# Patient Record
Sex: FEMALE | Race: WHITE | Employment: OTHER | URBAN - METROPOLITAN AREA
[De-identification: names, ages, dates, MRNs, and addresses within clinical notes are randomized per-mention and may not be internally consistent; named-entity substitution may affect disease eponyms.]

---

## 2020-08-25 RX ORDER — PROGESTERONE 100 MG/1
100 CAPSULE ORAL DAILY
COMMUNITY
Start: 2020-06-08

## 2020-08-25 RX ORDER — ESTRADIOL 1 MG/G
1 GEL TOPICAL DAILY
COMMUNITY
Start: 2020-08-13

## 2023-04-27 ENCOUNTER — TELEMEDICINE (OUTPATIENT)
Age: 63
End: 2023-04-27

## 2023-04-27 DIAGNOSIS — G89.29 CHRONIC PAIN OF LEFT KNEE: ICD-10-CM

## 2023-04-27 DIAGNOSIS — G89.29 CHRONIC PAIN OF RIGHT KNEE: Primary | ICD-10-CM

## 2023-04-27 DIAGNOSIS — M17.12 PRIMARY OSTEOARTHRITIS OF LEFT KNEE: ICD-10-CM

## 2023-04-27 DIAGNOSIS — M17.11 PRIMARY OSTEOARTHRITIS OF RIGHT KNEE: ICD-10-CM

## 2023-04-27 DIAGNOSIS — M25.562 CHRONIC PAIN OF LEFT KNEE: ICD-10-CM

## 2023-04-27 DIAGNOSIS — M25.561 CHRONIC PAIN OF RIGHT KNEE: Primary | ICD-10-CM

## 2023-04-27 RX ORDER — ALBUTEROL SULFATE 90 UG/1
AEROSOL, METERED RESPIRATORY (INHALATION)
COMMUNITY

## 2023-04-27 RX ORDER — AMITRIPTYLINE HYDROCHLORIDE 50 MG/1
TABLET, FILM COATED ORAL
COMMUNITY
Start: 2023-02-15

## 2023-04-27 RX ORDER — IBUPROFEN 800 MG/1
TABLET ORAL
COMMUNITY
Start: 2023-01-18

## 2023-04-27 RX ORDER — TRIAMCINOLONE ACETONIDE 1 MG/G
CREAM TOPICAL
COMMUNITY

## 2023-04-27 RX ORDER — DOXYCYCLINE HYCLATE 100 MG/1
CAPSULE ORAL
COMMUNITY

## 2023-04-27 RX ORDER — PROGESTERONE 100 MG/1
CAPSULE ORAL
COMMUNITY
Start: 2020-06-08

## 2023-04-27 RX ORDER — ESCITALOPRAM OXALATE 5 MG/1
TABLET ORAL
COMMUNITY

## 2023-04-27 RX ORDER — AMOXICILLIN 500 MG/1
CAPSULE ORAL
COMMUNITY
Start: 2023-04-26

## 2023-04-27 RX ORDER — CHLORHEXIDINE GLUCONATE 0.12 MG/ML
RINSE ORAL
COMMUNITY
Start: 2023-02-08

## 2023-04-27 RX ORDER — FLUTICASONE PROPIONATE 50 MCG
SPRAY, SUSPENSION (ML) NASAL
COMMUNITY

## 2023-04-27 RX ORDER — OXYCODONE HYDROCHLORIDE 10 MG/1
TABLET ORAL
COMMUNITY
Start: 2023-02-14

## 2023-04-27 RX ORDER — ESTRADIOL 1 MG/G
GEL TOPICAL
COMMUNITY
Start: 2020-08-13

## 2023-04-27 RX ORDER — PAROXETINE 10 MG/1
10 TABLET, FILM COATED ORAL DAILY
COMMUNITY
Start: 2023-03-06

## 2023-04-27 NOTE — PROGRESS NOTES
Name: Kary Null    : 1960       Chief Complaint   Patient presents with    Knee Pain        There were no vitals taken for this visit. No Known Allergies     Current Outpatient Medications   Medication Sig Dispense Refill    ibuprofen (ADVIL;MOTRIN) 800 MG tablet ibuprofen 800 mg tablet   TAKE 1 TABLET BY MOUTH THREE TIMES DAILY      Estradiol (DIVIGEL) 1 MG/GM GEL Divigel 1 mg/gram (0.1 %) transdermal gel packet   APPLY 1 PACKET TO UPPER THIGH ONCE DAILY      progesterone (PROMETRIUM) 100 MG CAPS capsule progesterone micronized 100 mg capsule   TAKE ONE CAPSULE BY MOUTH AT BEDTIME      albuterol sulfate HFA (PROVENTIL;VENTOLIN;PROAIR) 108 (90 Base) MCG/ACT inhaler albuterol sulfate HFA 90 mcg/actuation aerosol inhaler   INHALE 2 PUFFS BY MOUTH EVERY 4 HOURS FOR 10 DAYS AS NEEDED      amoxicillin (AMOXIL) 500 MG capsule       amitriptyline (ELAVIL) 50 MG tablet TAKE 1 TABLET BY MOUTH EVERYDAY AT BEDTIME      chlorhexidine (PERIDEX) 0.12 % solution 15 MILLILITER(S) 2 TIMES A DAY SWISH AND SPIT AFTER BREAKFAST AND AT BEDTIME FOR 7 DAYS      doxycycline hyclate (VIBRAMYCIN) 100 MG capsule doxycycline hyclate 100 mg capsule   Take 1 capsule twice a day by oral route as directed for 1 day. ONE NOW WITH FOOD- and the other later today- with your supper. escitalopram (LEXAPRO) 5 MG tablet escitalopram 5 mg tablet   TAKE 1 TABLET BY MOUTH EVERY DAY      fluticasone (FLONASE) 50 MCG/ACT nasal spray fluticasone propionate 50 mcg/actuation nasal spray,suspension      oxyCODONE HCl (OXY-IR) 10 MG immediate release tablet TAKE 1 TABLET BY MOUTH EVERY 6 HOURS AS NEEDED FOR SEVERE PAIN      triamcinolone (KENALOG) 0.1 % cream triamcinolone acetonide 0.1 % topical cream   APPLY TOPICALLY TO THE AFFECTED AREA TWICE DAILY      PARoxetine (PAXIL) 10 MG tablet Take 1 tablet by mouth daily       No current facility-administered medications for this visit.         There is no problem list on file for this

## 2023-05-04 ENCOUNTER — OFFICE VISIT (OUTPATIENT)
Age: 63
End: 2023-05-04

## 2023-05-04 VITALS — WEIGHT: 205 LBS | BODY MASS INDEX: 36.32 KG/M2 | HEIGHT: 63 IN

## 2023-05-04 DIAGNOSIS — M25.561 CHRONIC PAIN OF RIGHT KNEE: ICD-10-CM

## 2023-05-04 DIAGNOSIS — M17.11 PRIMARY OSTEOARTHRITIS OF RIGHT KNEE: ICD-10-CM

## 2023-05-04 DIAGNOSIS — M25.562 CHRONIC PAIN OF LEFT KNEE: Primary | ICD-10-CM

## 2023-05-04 DIAGNOSIS — M17.12 PRIMARY OSTEOARTHRITIS OF LEFT KNEE: ICD-10-CM

## 2023-05-04 DIAGNOSIS — G89.29 CHRONIC PAIN OF LEFT KNEE: Primary | ICD-10-CM

## 2023-05-04 DIAGNOSIS — G89.29 CHRONIC PAIN OF RIGHT KNEE: ICD-10-CM

## 2023-05-04 RX ORDER — LIDOCAINE HYDROCHLORIDE 10 MG/ML
9 INJECTION, SOLUTION INFILTRATION; PERINEURAL ONCE
Status: COMPLETED | OUTPATIENT
Start: 2023-05-04 | End: 2023-05-04

## 2023-05-04 RX ORDER — TRIAMCINOLONE ACETONIDE 40 MG/ML
40 INJECTION, SUSPENSION INTRA-ARTICULAR; INTRAMUSCULAR ONCE
Status: COMPLETED | OUTPATIENT
Start: 2023-05-04 | End: 2023-05-04

## 2023-05-04 RX ADMIN — TRIAMCINOLONE ACETONIDE 40 MG: 40 INJECTION, SUSPENSION INTRA-ARTICULAR; INTRAMUSCULAR at 15:46

## 2023-05-04 RX ADMIN — LIDOCAINE HYDROCHLORIDE 9 ML: 10 INJECTION, SOLUTION INFILTRATION; PERINEURAL at 15:45

## 2023-05-04 NOTE — PROGRESS NOTES
degrees, Some crepitation, Grossly neurovascularly intact, Good cap refill, No skin lesion, Moderate swelling, some gross instability, Some quadriceps weakness Kellgren and Luis Fernando at least grade 3    Right Knee -Decrease range of motion with flexion, Some crepitation, Grossly neurovascularly intact, Good cap refill, No skin lesion, Moderate swelling, some gross instability, Some quadriceps weaknessKellgren and Luis Fernando at least grade 3    Procedure Documentation:    I discussed in detail the risks, benefits and complications of an injection which included but are not limited to infection, skin reactions, hot swollen joint, and anaphylaxis with the patient. The patient verbalized understanding and gave informed consent for the injection. The patient's knees were flexed to 90° and the skin prepped using sterile alcohol solution. A sterile needle was inserted into the bilateral knee(s) and the mixture of 9 mL Lidocaine 1%, 1 mL Kenalog 40 mg was injected under sterile technique. The needle was withdrawn and the puncture site sealed with a Band-Aid. Technique: Under sterile conditions a Maestro ultrasound unit with a variable frequency (7.0-14.0 MHz) linear transducer was used to localize the placement of needle into the bilateral knee(s) joint. Findings: Successful needle placement for knee injection. Final images were taken and saved for permanent record. The patient tolerated the injection well. The patient was instructed to call the office immediately if there is any pain, redness, warmth, fever, or chills. Visit Diagnoses         Codes    Chronic pain of left knee    -  Primary M25.562, G89.29    Primary osteoarthritis of right knee     M17.11    Chronic pain of right knee     M25.561, G89.29    Primary osteoarthritis of left knee     M17.12               HPI:  The patient is here with bilateral knee pain, throbbing, burning pain. Pain is 7/10. Continues to have difficulty.     X-rays are positive

## 2023-10-19 ENCOUNTER — TELEMEDICINE (OUTPATIENT)
Age: 63
End: 2023-10-19
Payer: COMMERCIAL

## 2023-10-19 DIAGNOSIS — M17.0 OSTEOARTHRITIS OF BOTH KNEES, UNSPECIFIED OSTEOARTHRITIS TYPE: Primary | ICD-10-CM

## 2023-10-19 PROCEDURE — 99212 OFFICE O/P EST SF 10 MIN: CPT | Performed by: ORTHOPAEDIC SURGERY

## 2023-10-19 NOTE — PROGRESS NOTES
Mindy De Leon (:  1960) is a Established patient, presenting virtually for evaluation of the following:    [address], [address] [place] [address] [address] [id number]  Dept: [phone]  Dept Fax: [phone]   Chief Complaint   Patient presents with    Knee Pain     Patient-Reported Vitals  No data recorded   No Known Allergies  Current Outpatient Medications   Medication Sig Dispense Refill    ibuprofen (ADVIL;MOTRIN) 800 MG tablet ibuprofen 800 mg tablet   TAKE 1 TABLET BY MOUTH THREE TIMES DAILY       No current facility-administered medications for this visit. There is no problem list on file for this patient. Family History   Problem Relation Age of Onset    No Known Problems Mother     Cancer Father       Social History     Socioeconomic History    Marital status:      Spouse name: None    Number of children: None    Years of education: None    Highest education level: None   Tobacco Use    Smoking status: Former     Packs/day: 1.00     Years: 15.00     Additional pack years: 0.00     Total pack years: 15.00     Types: Cigarettes     Quit date:      Years since quitting: 15.8     Passive exposure: Past    Smokeless tobacco: Never   Vaping Use    Vaping Use: Never used   Substance and Sexual Activity    Alcohol use: Yes     Comment: occ    Drug use: Never    Sexual activity: Not Currently      History reviewed. No pertinent surgical history. History reviewed. No pertinent past medical history. I have reviewed and agree with [address] and [facility] and intake form in chart and the record furthermore I have reviewed prior medical record(s) regarding this patients care during this appointment.    Review of Systems:   Patient is a pleasant appearing individual, appropriately dressed, well hydrated, well nourished, who is alert, appropriately oriented for age, and in no acute distress with a normal gait

## 2023-10-26 ENCOUNTER — HOSPITAL ENCOUNTER (OUTPATIENT)
Age: 63
Discharge: HOME | End: 2023-10-26
Payer: COMMERCIAL

## 2023-10-26 ENCOUNTER — HOSPITAL ENCOUNTER (OUTPATIENT)
Age: 63
End: 2023-10-26
Payer: COMMERCIAL

## 2023-10-26 ENCOUNTER — OFFICE VISIT (OUTPATIENT)
Age: 63
End: 2023-10-26
Payer: COMMERCIAL

## 2023-10-26 DIAGNOSIS — Z01.818 PRE-OP TESTING: ICD-10-CM

## 2023-10-26 DIAGNOSIS — M25.562 LEFT KNEE PAIN, UNSPECIFIED CHRONICITY: ICD-10-CM

## 2023-10-26 DIAGNOSIS — M17.12 PRIMARY OSTEOARTHRITIS OF LEFT KNEE: ICD-10-CM

## 2023-10-26 DIAGNOSIS — M17.12 PRIMARY OSTEOARTHRITIS OF LEFT KNEE: Primary | ICD-10-CM

## 2023-10-26 PROBLEM — R73.9 HYPERGLYCEMIA: Status: ACTIVE | Noted: 2023-10-26

## 2023-10-26 PROBLEM — R68.89 OTHER GENERAL SYMPTOMS AND SIGNS: Status: ACTIVE | Noted: 2023-10-26

## 2023-10-26 PROBLEM — R23.2 HOT FLASHES: Status: ACTIVE | Noted: 2023-10-26

## 2023-10-26 PROBLEM — E66.9 OBESITY WITH BODY MASS INDEX 30 OR GREATER: Status: ACTIVE | Noted: 2023-10-26

## 2023-10-26 PROBLEM — N95.1 MENOPAUSAL SYMPTOM: Status: ACTIVE | Noted: 2023-10-26

## 2023-10-26 PROBLEM — Z99.89 DEPENDENCE ON OTHER ENABLING MACHINES AND DEVICES: Status: ACTIVE | Noted: 2023-10-26

## 2023-10-26 LAB
ANION GAP SERPL CALC-SCNC: 7 MMOL/L (ref 3–18)
BUN SERPL-MCNC: 21 MG/DL (ref 7–18)
BUN/CREAT SERPL: 35 (ref 12–20)
CA-I BLD-MCNC: 9.4 MG/DL (ref 8.5–10.1)
CHLORIDE SERPL-SCNC: 104 MMOL/L (ref 100–111)
CO2 SERPL-SCNC: 28 MMOL/L (ref 21–32)
CREAT SERPL-MCNC: 0.6 MG/DL (ref 0.6–1.3)
EKG ATRIAL RATE: 69 BPM
EKG DIAGNOSIS: NORMAL
EKG P AXIS: 65 DEGREES
EKG P-R INTERVAL: 158 MS
EKG Q-T INTERVAL: 402 MS
EKG QRS DURATION: 82 MS
EKG QTC CALCULATION (BAZETT): 430 MS
EKG R AXIS: 19 DEGREES
EKG T AXIS: 15 DEGREES
EKG VENTRICULAR RATE: 69 BPM
ERYTHROCYTE [DISTWIDTH] IN BLOOD BY AUTOMATED COUNT: 12.4 % (ref 11.6–14.5)
GLUCOSE SERPL-MCNC: 98 MG/DL (ref 74–99)
HCT VFR BLD AUTO: 40.1 % (ref 35–45)
HGB BLD-MCNC: 13.2 G/DL (ref 12–16)
MCH RBC QN AUTO: 29.4 PG (ref 24–34)
MCHC RBC AUTO-ENTMCNC: 32.9 G/DL (ref 31–37)
MCV RBC AUTO: 89.3 FL (ref 78–100)
NRBC # BLD: 0 K/UL (ref 0–0.01)
NRBC BLD-RTO: 0 PER 100 WBC
PLATELET # BLD AUTO: 409 K/UL (ref 135–420)
PMV BLD AUTO: 11 FL (ref 9.2–11.8)
POTASSIUM SERPL-SCNC: 3.7 MMOL/L (ref 3.5–5.5)
RBC # BLD AUTO: 4.49 M/UL (ref 4.2–5.3)
SODIUM SERPL-SCNC: 139 MMOL/L (ref 136–145)
WBC # BLD AUTO: 11.8 K/UL (ref 4.6–13.2)

## 2023-10-26 PROCEDURE — 80048 BASIC METABOLIC PNL TOTAL CA: CPT

## 2023-10-26 PROCEDURE — 99214 OFFICE O/P EST MOD 30 MIN: CPT | Performed by: ORTHOPAEDIC SURGERY

## 2023-10-26 PROCEDURE — 93005 ELECTROCARDIOGRAM TRACING: CPT

## 2023-10-26 PROCEDURE — 71046 X-RAY EXAM CHEST 2 VIEWS: CPT

## 2023-10-26 PROCEDURE — 36415 COLL VENOUS BLD VENIPUNCTURE: CPT

## 2023-10-26 PROCEDURE — 85027 COMPLETE CBC AUTOMATED: CPT

## 2023-10-26 NOTE — PROGRESS NOTES
Grossly neurovascularly intact, Good cap refill, No skin lesion, No swelling, No gross instability, No quadriceps weakness     Patient has failed conservative treatments including cortisone injections & home exercise programs. Patient is not able to walk distances longer than 15 minutes, also unable to do activities that involve squatting or kneeling. Due to the severe nature of patients osteoarthritis patient is unable to complete a formal physical therapy program due to pain severity, this also would have no benefit for the patient with grade 4 Kellgren-Luis Fernando. Inpatient status: The patient has admitted to severe pain in the affected knee and due to such pain they are unable to complete activities of daily living at home and/or work on a regular basis where conservative treatments have failed. After extensive discussion with the patient, they have chosen to receive a total knee replacement with the expectation of inpatient procedure. Their dependent functional status (i.e. lack of capable support and safety at home, pain management, comorbities, or difficulty ambulating with assistive walking devices) would deem them a candidate for an inpatient stay. The patient acknowledges and understand the plan. The risks of surgery were explained to the patient which include but not limited to infection, nerve injury, artery injury, tendon injury, poor result, poor wound healing, unforeseen incidence, bleeding, infection, nerve damage, failure to improve, worsening of symptoms, morbidity, and mortality risks were explained. All questions were answered. Patient was told of no guarantees. Patient accepts all risks and benefits. A consent for surgery will be documented and signed by the patient or a legal guardian. All questions were answered. The procedure was explained in detail.      The patient was counseled about the risks of anita Covid-19 during their perioperative period and any recovery window from their

## 2023-10-27 LAB
BACTERIA SPEC CULT: NORMAL
BACTERIA SPEC CULT: NORMAL
Lab: NORMAL

## 2023-10-30 DIAGNOSIS — Z96.652 STATUS POST TOTAL LEFT KNEE REPLACEMENT: Primary | ICD-10-CM

## 2023-10-31 ENCOUNTER — TELEPHONE (OUTPATIENT)
Age: 63
End: 2023-10-31

## 2023-10-31 DIAGNOSIS — M17.12 PRIMARY OSTEOARTHRITIS OF LEFT KNEE: Primary | ICD-10-CM

## 2023-10-31 RX ORDER — CEPHALEXIN 500 MG/1
500 CAPSULE ORAL EVERY 8 HOURS
Qty: 21 CAPSULE | Refills: 0 | Status: SHIPPED | OUTPATIENT
Start: 2023-10-31 | End: 2023-11-07

## 2023-10-31 RX ORDER — ASPIRIN 325 MG
325 TABLET ORAL 2 TIMES DAILY
Qty: 60 TABLET | Refills: 0 | Status: SHIPPED | OUTPATIENT
Start: 2023-10-31

## 2023-10-31 RX ORDER — OXYCODONE HYDROCHLORIDE AND ACETAMINOPHEN 5; 325 MG/1; MG/1
1 TABLET ORAL
Qty: 30 TABLET | Refills: 0 | Status: SHIPPED | OUTPATIENT
Start: 2023-10-31 | End: 2023-11-08

## 2023-10-31 RX ORDER — ONDANSETRON 8 MG/1
8 TABLET, ORALLY DISINTEGRATING ORAL EVERY 8 HOURS PRN
Qty: 20 TABLET | Refills: 0 | Status: SHIPPED | OUTPATIENT
Start: 2023-10-31 | End: 2023-11-07

## 2023-10-31 NOTE — TELEPHONE ENCOUNTER
Patient called in stating that her post surgery medications were not called in     [facility] [address], [address] [phone] - F [phone]

## 2023-11-09 ENCOUNTER — TELEPHONE (OUTPATIENT)
Age: 63
End: 2023-11-09

## 2023-11-09 DIAGNOSIS — Z96.652 STATUS POST TOTAL KNEE REPLACEMENT, LEFT: Primary | ICD-10-CM

## 2023-11-09 RX ORDER — HYDROMORPHONE HYDROCHLORIDE 2 MG/1
2 TABLET ORAL
Qty: 30 TABLET | Refills: 0 | Status: SHIPPED | OUTPATIENT
Start: 2023-11-09 | End: 2023-11-17

## 2023-11-09 NOTE — TELEPHONE ENCOUNTER
called in stating that his wife is in severe pain and meds are not helping. TKR yesterday. She has tried taking two and that did not help.  Please see me for the rest.

## 2023-11-10 ENCOUNTER — TELEPHONE (OUTPATIENT)
Age: 63
End: 2023-11-10

## 2023-11-10 NOTE — TELEPHONE ENCOUNTER
Patient requesting refill for percocet Left  TKA on 11/08/2023 per patient she doesn't want to run out over the weekend.       [facility] [address] [place]

## 2023-11-14 ENCOUNTER — HOME HEALTH ADMISSION (OUTPATIENT)
Dept: HOME HEALTH SERVICES | Facility: HOME HEALTH | Age: 63
End: 2023-11-14
Payer: COMMERCIAL

## 2023-11-14 DIAGNOSIS — Z96.652 STATUS POST TOTAL KNEE REPLACEMENT, LEFT: Primary | ICD-10-CM

## 2023-11-15 NOTE — PROGRESS NOTES
Nitin Patel (:  1960) is a Established patient, evaluated via telephone on 2023    [id number] [facility],[address], [address] [place] [address] 970 [id number]  Dept: [phone]  Dept Fax: [phone]   Chief Complaint   Patient presents with    Post-Op Check    Knee Pain     Patient-Reported Vitals  No data recorded   Allergies   Allergen Reactions    Codeine      Other reaction(s): Not available     Current Outpatient Medications   Medication Sig Dispense Refill    HYDROmorphone (DILAUDID) 2 MG tablet Take 1 tablet by mouth every 4-6 hours as needed for Pain for up to 8 days. Max Daily Amount: 12 mg 30 tablet 0    aspirin 325 MG tablet Take 1 tablet by mouth in the morning and at bedtime START 24 HOURS AFTER SURGERY 60 tablet 0     No current facility-administered medications for this visit. Patient Active Problem List   Diagnosis    Other general symptoms and signs    Obesity with body mass index 30 or greater    Menopausal symptom    Hyperglycemia    Hot flashes    Dependence on other enabling machines and devices      Family History   Problem Relation Age of Onset    No Known Problems Mother     Cancer Father       Social History     Socioeconomic History    Marital status:      Spouse name: None    Number of children: None    Years of education: None    Highest education level: None   Tobacco Use    Smoking status: Former     Packs/day: 1.00     Years: 15.00     Additional pack years: 0.00     Total pack years: 15.00     Types: Cigarettes     Quit date:      Years since quitting: 15.8     Passive exposure: Past    Smokeless tobacco: Never   Vaping Use    Vaping Use: Never used   Substance and Sexual Activity    Alcohol use: Yes     Comment: occ    Drug use: Never    Sexual activity: Not Currently      History reviewed. No pertinent surgical history. History reviewed. No pertinent past medical history.

## 2023-11-16 ENCOUNTER — HOME CARE VISIT (OUTPATIENT)
Facility: HOME HEALTH | Age: 63
End: 2023-11-16
Payer: COMMERCIAL

## 2023-11-16 ENCOUNTER — TELEMEDICINE (OUTPATIENT)
Age: 63
End: 2023-11-16

## 2023-11-16 VITALS
TEMPERATURE: 97.2 F | HEART RATE: 66 BPM | DIASTOLIC BLOOD PRESSURE: 82 MMHG | RESPIRATION RATE: 16 BRPM | OXYGEN SATURATION: 98 % | SYSTOLIC BLOOD PRESSURE: 122 MMHG

## 2023-11-16 DIAGNOSIS — Z96.652 STATUS POST TOTAL KNEE REPLACEMENT, LEFT: Primary | ICD-10-CM

## 2023-11-16 PROCEDURE — G0151 HHCP-SERV OF PT,EA 15 MIN: HCPCS

## 2023-11-16 PROCEDURE — 99024 POSTOP FOLLOW-UP VISIT: CPT

## 2023-11-16 NOTE — HOME HEALTH
Reason for referral, PMH SUMMARY of clinical condition: 60 yo female with TKR admitted to home care for PT; PMH significant for: morbid obesity, MEME, hyperglycemia, OA left knee     Clinical Assessment/Skilled reason for admission to home health (What this means for the patient overall and need for ongoing skilled care): pt lives with spouse in very spacious one story home that is clean and free of clutter; there are 6 steps to exit; pt is limited to short distance ambulation in her home with r. walker; she reports mod to severe left knee pain; she had a virtual visit with MD this am who instructed her to remove dressing during the call which she did; pt is ind in bed mobility with supervision to min assist for all transfers with r. walker; PLOF the patient was ind in ambulation without devices and ind in all ADLs and IADLs including driving; will follow MD protocol of PT 1w1,2w2; pt has f/u visit with MD on 12/07/23    Diagnosis: left TKR 11/08/23    Subjective (statement from pt/cg that is relative to why you are there): pt reports mod pain left knee well relieved with meds and ice    Caregiver: spouse;   Caregiver assists with: ADLs, IADLs, transportation; meds Caregiver unable to assist with: n/a . Caregiver is available 24/7; Caregiver is present at this visit but did not  participate with clinician. Medications reconciled and all medications are available in the home this visit. The following education was provided regarding medications: medication interactions and look alike medications: n/a . Patient/CG able to demonstrate knowledge through teach back with 100 percent accuracy. MD  notified of any discrepancies/medication interactions  n/a. A list of reconciled medications has been uploaded to media:  YES     High risk med teaching on:   ANTICOAGULANTS - Aspirin  Instructed pt/CG can select appropriate dose ( Medication/dose/frequency).   No ASA or NSAIDS unless ordered by MD.  S & S excessive

## 2023-11-17 ASSESSMENT — ENCOUNTER SYMPTOMS: PAIN LOCATION - PAIN QUALITY: DULL AND SHARP

## 2023-11-20 ENCOUNTER — TELEPHONE (OUTPATIENT)
Age: 63
End: 2023-11-20

## 2023-11-20 ENCOUNTER — HOME CARE VISIT (OUTPATIENT)
Facility: HOME HEALTH | Age: 63
End: 2023-11-20
Payer: COMMERCIAL

## 2023-11-20 DIAGNOSIS — Z96.652 STATUS POST TOTAL KNEE REPLACEMENT, LEFT: Primary | ICD-10-CM

## 2023-11-20 PROCEDURE — G0151 HHCP-SERV OF PT,EA 15 MIN: HCPCS

## 2023-11-20 RX ORDER — HYDROMORPHONE HYDROCHLORIDE 2 MG/1
2 TABLET ORAL
Qty: 30 TABLET | Refills: 0 | Status: SHIPPED | OUTPATIENT
Start: 2023-11-20 | End: 2023-11-28

## 2023-11-20 NOTE — TELEPHONE ENCOUNTER
Lt TKR 11/8/23    Patient requesting refill of Dilaudid.  Last filled on 11/9/23    [facility]/pharmacy #[id number]- [place], [place] - [address] [place] [address]

## 2023-11-21 VITALS
OXYGEN SATURATION: 99 % | DIASTOLIC BLOOD PRESSURE: 82 MMHG | RESPIRATION RATE: 14 BRPM | TEMPERATURE: 97.6 F | HEART RATE: 66 BPM | SYSTOLIC BLOOD PRESSURE: 138 MMHG

## 2023-11-21 ASSESSMENT — ENCOUNTER SYMPTOMS: PAIN LOCATION - PAIN QUALITY: DULL AND SHARP

## 2023-11-21 NOTE — HOME HEALTH
Subjective: pt reports compliance with HEP and home walking program; she states pain well controlled with meds and ice  Falls since last visit NO  (if yes complete the Fall Tracking Form and include bsrifallreport):   Caregiver involvement changes: none  Home health supplies by type and quantity ordered/delivered this visit include: n/a    Clinician asked if patient has had any physician contact since last home care visit and patient states: NO  Clinician asked if patient has any new or changed medications and patient states:  NO  If Yes, were medications reconciled?  n/a  Was the certifying physician notified of changes in medications? n/a    Clinical assessment (what this visit means for the patient overall and need for ongoing skilled care) and progress or lack of progress towards SPECIFIC goals: pt progressing well with the program; ROM left knee: -3 to 95 degrees; goals met for high risk meds and DVT s/s and prevention    Written Teaching Material Utilized: written/pictoral HEP    Interdisciplinary communication with: n/a    Discharge planning as follows: pt to remain at home with assist of caregiver and guidance from MD    Specific plan for next visit: continue ther ex in TKR protocol and gait training with r. walker

## 2023-11-22 ENCOUNTER — HOME CARE VISIT (OUTPATIENT)
Facility: HOME HEALTH | Age: 63
End: 2023-11-22
Payer: COMMERCIAL

## 2023-11-22 PROCEDURE — G0151 HHCP-SERV OF PT,EA 15 MIN: HCPCS

## 2023-11-23 VITALS
DIASTOLIC BLOOD PRESSURE: 80 MMHG | SYSTOLIC BLOOD PRESSURE: 132 MMHG | RESPIRATION RATE: 14 BRPM | OXYGEN SATURATION: 97 % | TEMPERATURE: 97.4 F | HEART RATE: 64 BPM

## 2023-11-23 ASSESSMENT — ENCOUNTER SYMPTOMS: PAIN LOCATION - PAIN QUALITY: ACHE

## 2023-11-23 NOTE — HOME HEALTH
Subjective: pt reports compliance with HEP and home walking program; she states pain well controlled with meds and ice     Falls since last visit NO (if yes complete the Fall Tracking Form and include bsrifallreport):   Caregiver involvement changes: none   Home health supplies by type and quantity ordered/delivered this visit include: n/a     Clinician asked if patient has had any physician contact since last home care visit and patient states: NO   Clinician asked if patient has any new or changed medications and patient states: NO   If Yes, were medications reconciled? n/a   Was the certifying physician notified of changes in medications? n/a     Clinical assessment (what this visit means for the patient overall and need for ongoing skilled care) and progress or lack of progress towards SPECIFIC goals: pt progressing well with the program; goals met for infection control and all household transfers     Written Teaching Material Utilized: written/pictoral HEP     Interdisciplinary communication with: n/a     Discharge planning as follows: pt to remain at home with assist of caregiver and guidance from MD     Specific plan for next visit: continue ther ex in TKR protocol and instruct in stairs to exit home

## 2023-11-28 ENCOUNTER — HOME CARE VISIT (OUTPATIENT)
Facility: HOME HEALTH | Age: 63
End: 2023-11-28
Payer: COMMERCIAL

## 2023-11-28 PROCEDURE — G0151 HHCP-SERV OF PT,EA 15 MIN: HCPCS

## 2023-11-29 ENCOUNTER — TELEPHONE (OUTPATIENT)
Age: 63
End: 2023-11-29

## 2023-11-29 VITALS
RESPIRATION RATE: 14 BRPM | HEART RATE: 72 BPM | TEMPERATURE: 98 F | OXYGEN SATURATION: 96 % | SYSTOLIC BLOOD PRESSURE: 128 MMHG | DIASTOLIC BLOOD PRESSURE: 72 MMHG

## 2023-11-29 DIAGNOSIS — Z96.652 STATUS POST TOTAL KNEE REPLACEMENT, LEFT: Primary | ICD-10-CM

## 2023-11-29 ASSESSMENT — ENCOUNTER SYMPTOMS: PAIN LOCATION - PAIN QUALITY: DULL

## 2023-11-29 NOTE — HOME HEALTH
Subjective: pt reports compliance with HEP and home walking program; she states pain well controlled with meds and ice     Falls since last visit NO (if yes complete the Fall Tracking Form and include bsrifallreport):   Caregiver involvement changes: none   Home health supplies by type and quantity ordered/delivered this visit include: n/a     Clinician asked if patient has had any physician contact since last home care visit and patient states: Estrella Gomez asked if patient has any new or changed medications and patient states: NO  If Yes, were medications reconciled? n/a   Was the certifying physician notified of changes in medications? n/a     Clinical assessment (what this visit means for the patient overall and need for ongoing skilled care) and progress or lack of progress towards SPECIFIC goals: pt progressing well with the program; goals met for ROM, stairs, fall prevention    Written Teaching Material Utilized: written/pictoral HEP     Interdisciplinary communication with: n/a     Discharge planning as follows: pt to remain at home with assist of caregiver and guidance from MD; initiated DC planning for next visit     Specific plan for next visit: continue ther ex in TKR protocol with focus on strengthening;  DC

## 2023-11-29 NOTE — TELEPHONE ENCOUNTER
11/08/23 lt tkr    She has already had 2 weeks of home health and is asking for another referral to continue home health.

## 2023-11-29 NOTE — TELEPHONE ENCOUNTER
Lt tkr 11/8/23    Patient requesting refill of Dilaudid, last refill was 11/20/23    Pharmacy: [address] [facility], [address] [facility] [address]

## 2023-11-30 ENCOUNTER — HOME CARE VISIT (OUTPATIENT)
Facility: HOME HEALTH | Age: 63
End: 2023-11-30
Payer: COMMERCIAL

## 2023-11-30 VITALS
OXYGEN SATURATION: 99 % | RESPIRATION RATE: 14 BRPM | DIASTOLIC BLOOD PRESSURE: 72 MMHG | HEART RATE: 68 BPM | SYSTOLIC BLOOD PRESSURE: 132 MMHG | TEMPERATURE: 97.8 F

## 2023-11-30 PROCEDURE — G0151 HHCP-SERV OF PT,EA 15 MIN: HCPCS

## 2023-11-30 RX ORDER — HYDROMORPHONE HYDROCHLORIDE 2 MG/1
2 TABLET ORAL
Qty: 30 TABLET | Refills: 0 | Status: SHIPPED | OUTPATIENT
Start: 2023-11-30 | End: 2023-12-08

## 2023-11-30 ASSESSMENT — ENCOUNTER SYMPTOMS: PAIN LOCATION - PAIN QUALITY: DULL

## 2023-12-02 NOTE — HOME HEALTH
Subjective: pt denies complaints; she states she is not using the walker any more in her home  Falls since last visit   NO  (if yes complete the Fall Tracking Form and include bsrifallreport):   Caregiver involvement changes: none  Home health supplies by type and quantity ordered/delivered this visit include:  n/a    Clinician asked if patient has had any physician contact since last home care visit and patient states: NO  Clinician asked if patient has any new or changed medications and patient states:  NO  If Yes, were medications reconciled?  n/a  Was the certifying physician notified of changes in medications?  n/a    Clinical assessment (what this visit means for the patient overall and need for ongoing skilled care) and progress or lack of progress towards SPECIFIC goals: pt ready for DC from PT; all goals met    Written Teaching Material Utilized: written/pictoral HEP in TKR protocol    Interdisciplinary communication with: n/a    Discharge planning as follows: pt to remain at home with assist of caregiver and gudiance from MD; pt has f/u appt with MD 12/07/23    Specific plan for next visit: Bert Ramírez

## 2023-12-04 NOTE — PROGRESS NOTES
Leticia Kumar (:  1960) is a Established patient, evaluated via telephone on 2023    [address], [address] [facility] [phone]  Dept: [phone]  Dept Fax: [phone]   Chief Complaint   Patient presents with    Knee Pain    Post-Op Check     Patient-Reported Vitals  No data recorded   Allergies   Allergen Reactions    Codeine      Other reaction(s): Not available     Current Outpatient Medications   Medication Sig Dispense Refill    HYDROmorphone (DILAUDID) 2 MG tablet Take 1 tablet by mouth every 4-6 hours as needed for Pain for up to 8 days. Max Daily Amount: 12 mg 30 tablet 0    cephALEXin (KEFLEX) 500 MG capsule Take 1 capsule by mouth 4 times daily for 7 days 28 capsule 0    ondansetron (ZOFRAN-ODT) 8 MG TBDP disintegrating tablet Place 8 mg under the tongue every 8 hours as needed for Nausea. docusate sodium (DULCOLAX) 100 MG capsule Take 100 mg by mouth daily. acetaminophen (TYLENOL) 500 MG tablet Take 1,000 mg by mouth every 8 hours as needed for Pain. for mild to moderate pain      Melatonin 10 MG TABS Take 20 mg by mouth nightly as needed (sleep). Misc Natural Products (YUMVS BEET ROOT-TART CHERRY) 250-0.5 MG CHEW Take 2 tablets by mouth daily. aspirin 325 MG tablet Take 1 tablet by mouth in the morning and at bedtime START 24 HOURS AFTER SURGERY 60 tablet 0     No current facility-administered medications for this visit.       Patient Active Problem List   Diagnosis    Other general symptoms and signs    Obesity with body mass index 30 or greater    Menopausal symptom    Hyperglycemia    Hot flashes    Dependence on other enabling machines and devices      Family History   Problem Relation Age of Onset    No Known Problems Mother     Cancer Father       Social History     Socioeconomic History    Marital status:    Tobacco Use    Smoking status: Former

## 2023-12-05 ENCOUNTER — PATIENT MESSAGE (OUTPATIENT)
Age: 63
End: 2023-12-05

## 2023-12-05 DIAGNOSIS — Z96.652 STATUS POST TOTAL KNEE REPLACEMENT, LEFT: Primary | ICD-10-CM

## 2023-12-05 RX ORDER — CEPHALEXIN 500 MG/1
500 CAPSULE ORAL 4 TIMES DAILY
Qty: 28 CAPSULE | Refills: 0 | Status: SHIPPED | OUTPATIENT
Start: 2023-12-05 | End: 2023-12-12

## 2023-12-07 ENCOUNTER — TELEMEDICINE (OUTPATIENT)
Age: 63
End: 2023-12-07

## 2023-12-07 DIAGNOSIS — M25.562 LEFT KNEE PAIN, UNSPECIFIED CHRONICITY: ICD-10-CM

## 2023-12-07 DIAGNOSIS — Z96.652 STATUS POST TOTAL KNEE REPLACEMENT, LEFT: Primary | ICD-10-CM

## 2023-12-07 PROCEDURE — 99024 POSTOP FOLLOW-UP VISIT: CPT

## 2023-12-07 RX ORDER — HYDROMORPHONE HYDROCHLORIDE 2 MG/1
2 TABLET ORAL
Qty: 30 TABLET | Refills: 0 | Status: SHIPPED | OUTPATIENT
Start: 2023-12-07 | End: 2023-12-15

## 2023-12-07 NOTE — PATIENT INSTRUCTIONS
Patient Education        Total Knee Replacement: What to Expect at [address] had a total knee replacement. The doctor replaced the worn ends of the bones that connect to your knee (thighbone and lower leg bone) with plastic and metal parts. When you leave the hospital, you should be able to move around with a walker or crutches. But you will need someone to help you at home until you have more energy and can move around better. You will go home with a bandage and stitches, staples, skin glue, or tape strips. Change the bandage as your doctor tells you to. If you have stitches or staples, your doctor will remove them about 2 weeks after your surgery. Glue or tape strips will fall off on their own over time. You may still have some mild pain, and the area may be swollen for a few months after surgery. Your knee will continue to improve for up to a year. You will probably use a walker for some time after surgery. When you are ready, you can use a cane. You may be able to walk without support after a couple weeks, or when you are comfortable. You will need to do months of physical rehabilitation (rehab) after a knee replacement. Rehab will help you strengthen the muscles of the knee and help you regain movement. After you recover, your artificial knee will allow you to do normal daily activities with less pain or no pain at all. You may be able to hike, dance, or ride a bike. Talk to your doctor about whether you can do more strenuous activities. Always tell your caregivers that you have an artificial knee. How long it will take to walk on your own, return to normal activities, and go back to work depends on your health and how well your rehabilitation (rehab) program goes. The better you do with your rehab exercises, the quicker you will get your strength and movement back. This care sheet gives you a general idea about how long it will take for you to recover.  But each person recovers at a

## 2024-03-07 RX ORDER — DIPHENHYDRAMINE HCL 12.5MG/5ML
LIQUID (ML) ORAL
Qty: 60 TABLET | Refills: 0 | Status: SHIPPED | OUTPATIENT
Start: 2024-03-07

## 2024-07-25 ENCOUNTER — HOSPITAL ENCOUNTER (OUTPATIENT)
Facility: HOSPITAL | Age: 64
Discharge: HOME | End: 2024-07-25
Payer: COMMERCIAL

## 2024-07-25 DIAGNOSIS — M47.816 LUMBAR SPONDYLOSIS: ICD-10-CM

## 2024-07-25 DIAGNOSIS — M43.17 SPONDYLOLISTHESIS OF LUMBOSACRAL REGION: ICD-10-CM

## 2024-07-25 PROCEDURE — 72148 MRI LUMBAR SPINE W/O DYE: CPT
